# Patient Record
Sex: FEMALE | Race: WHITE | URBAN - METROPOLITAN AREA
[De-identification: names, ages, dates, MRNs, and addresses within clinical notes are randomized per-mention and may not be internally consistent; named-entity substitution may affect disease eponyms.]

---

## 2020-08-13 ENCOUNTER — TELEPHONE (OUTPATIENT)
Dept: TRANSPLANT | Facility: CLINIC | Age: 25
End: 2020-08-13

## 2020-08-13 NOTE — TELEPHONE ENCOUNTER
"[facility] BREEZE  [id number]      LIVING KIDNEY DONOR EVALUATION  Donor First Name Anamika Donor MRN    Donor Last Name Marko Completed 2020 12:40 PM    1995 Record ID [id number]   BREEZE Screen PASSED     Intended Recipient  Recipient First Name Per Recipient MRN    Recipient Last Name Kay Relationship Member of the same community   Recipient  1968 Recipient Diagnosis    Recipient's ABO      Donor Information  Age 24 Gender Female   Ht 160 cm (5' 3'') Race    Wt 70.3 kg (155 lbs) Ethnicity Not /   BMI 27.50 kg/m  Preferred Language English      Required No     Blood Type O   Demographics  Home Address [address] [place] [address] [address]    [facility] #    [facility] Code [id number] Type    Country [place] Preferred Contact day    Email [email or web address] Preferred Contact time 11:00 AM-1:00 PM   &&   Donor's Medical Information  Medical History Anxiety d/o NOS   Depression   To Prevent Pregnancy (Birth Control) Medications Nuvaring   Surgical History Fracture Repair, NOS, Right Arm Allergies NKDA   Social History EtOH: Occasional (1-2 drinks/week)   Illicit Drug Use: Denies   Tobacco: Denies Self-Reported Functional Status \"I am able to participate in strenuous sports such as swimming, singles tennis, football, basketball, or skiing\"   Family Medical History Cancer (Father, Aunt or Uncle, Grandparent)   Diabetes (denies)   Heart Disease (denies)   Hypertension (Father, Grandparent, Aunt or Uncle)   Kidney Disease (denies)   Kidney Stones (denies) Exercise Frequency Exercise (3 X per week)   Review of Organ Systems  Review of Systems Airway or Lungs: No   Blood Disorder: No   Cancer: No   Diabetes,Thyroid,Adrenal,Endocrine Disorder: No   Digestive or Liver: No   Female Health: No   Heart or Circulatory System: No   Immune Diseases: No   Kidneys and Bladder: No   Muscles,Bones,Joints: No   Neuro: No   Psych: " Yes   &&   Donor's Social Information  Marital Status Single Living Accommodation Owns own home/apartment   Level of Education College or baccalaureate degree complete Living Arrangement With spouse   Employment Status Full Time Concerns: health and life insurance Yes   Employer [employer] Concerns: job security and lost income No   Occupation      Medical Insurance Status Has medical insurance     High Risk Behavior  High Risk Behaviors Blood transfusion < 12 months. (NO)   Commercial sex < 12 months. (NO)   Illicit IV drug use < 5yrs. (NO)   Other high risk sexual contact < 12 months. (NO)   Reason for Donation  Referral Social Media Reason for Donation I m young and healthy. Per is a pillar in my [employer] community and deserves a full life.   Permission to Disclose Inquiry No Patient Comments    Donor Motivation Level Ready to start evaluation with reservations     PCP Contact  PCP Name Katelyn N Moritz, NP   PCP [facility]   PCP Phone [phone]   Emergency Contact  First Name Ashlee First Name Hebert   Last Name Marko Last Name Eli   Phone # [phone] Phone # [phone]   Phone Type Mobile Phone Type Mobile   Relationship Mother Relationship Friend or Other   Office Use  Reviewed By    Reviewed 8/13/2020 2:12 PM   Admin Folder Archive   Comments    Lost for Followup    Extended Comments    BREEZE ID [id number] survey status completed   Activity History  Call  Task    Due Date 8/13/2020   Last Modified Date/Time 8/13/2020 11:49 AM   Comments

## 2020-08-14 DIAGNOSIS — Z00.5 TRANSPLANT DONOR EVALUATION: Primary | ICD-10-CM

## 2020-11-02 ENCOUNTER — DOCUMENTATION ONLY (OUTPATIENT)
Dept: TRANSPLANT | Facility: CLINIC | Age: 25
End: 2020-11-02

## 2020-11-02 NOTE — PROGRESS NOTES
"\"I may be interested in the future, but at this time I don't think I can continue with the process. Apologies for falling off of the map, my dad was diagnosed with cancer shortly after my initial application and anything that wasn't absolutely urgent fell by the wayside for me. I will certainly reach out if it becomes possible for me to start the donation process again in the future. I know this is still something that Per needs crucially.    Thanks so much,  Anamika\"    Received this Email 11/2/20    "